# Patient Record
(demographics unavailable — no encounter records)

---

## 2024-10-23 NOTE — PLAN
[FreeTextEntry1] : 50 yo male, accompapnied by dtr, presents for evaluation an dmed renewals  chronic fatigue --   increased energy of late with medication prehypertension -   130/80                 controlled without medication  low testosterone .....     elvated levels after treatment.   dosing now lowered to 200mg q 10 days

## 2025-01-23 NOTE — HEALTH RISK ASSESSMENT
[0] : 2) Feeling down, depressed, or hopeless: Not at all (0) [PHQ-2 Negative - No further assessment needed] : PHQ-2 Negative - No further assessment needed [[id number]] : 0

## 2025-01-23 NOTE — HISTORY OF PRESENT ILLNESS
[FreeTextEntry1] : Needed prescription filled [de-identified] : GERD Low testosterone Morbid obesity

## 2025-01-23 NOTE — PLAN
[FreeTextEntry1] : 50-year-old gentleman presents for evaluation medication reconciliation GERD-he tolerates his symptoms occasionally he will use over-the-counter Pepcid with success Low testosterone-medication is reviewed and renewed Doing well at current dose has more energy Morbid obesity-5 foot 5 inch 212 pounds BMI greater than 30 diet and exercise are discussed as the cornerstone of the treatment of diseases of metabolic syndrome 130/70 Not currently on medication for hypertension

## 2025-02-20 NOTE — HEALTH RISK ASSESSMENT
[0] : 2) Feeling down, depressed, or hopeless: Not at all (0) [PHQ-2 Negative - No further assessment needed] : PHQ-2 Negative - No further assessment needed [[id number]] : 0

## 2025-02-20 NOTE — PLAN
[FreeTextEntry1] : 50-year-old male presents for evaluation medication reconciliation Morbid obesity-5 foot 5 inch 221 pounds BMI greater than 30 diet and exercise are discussed is cornerstone of the treatment of the diseases of metabolic syndrome Dependent edema-closely related to weight he is on his feet all day long he is encouraged to drop weight and then monitor the loss of the dependent edema GERD-over-the-counter Protonix as needed Low testosterone-renewal of testosterone cypionate 200 mg/mL on a weekly basis every 10 days

## 2025-03-28 NOTE — PLAN
[FreeTextEntry1] : 50-year-old gentleman for evaluation PSA-prostate cancer screening last year when he had a normal PSA and be repeated in June he has concerns about prostate cancer but is reassured Obesity-snoring is an issue he is concerned about his enlarged tonsils as being etiology however when he loses weight his snoring stops he is referred to ENT for further evaluation Low testosterone in male he continues on 200 mg of testosterone IM on a weekly basis with periodic levels for monitoring

## 2025-06-04 NOTE — HISTORY OF PRESENT ILLNESS
[FreeTextEntry1] : "I need a prescription for testosterone " [de-identified] : Supplementation with testosterone on a weekly basis/changed to every 2 weeks because of elevated levels

## 2025-06-04 NOTE — PLAN
[FreeTextEntry1] : 50-year-old gentleman for evaluation PSA-prostate cancer screening last year when he had a normal PSA and be repeated in June he has concerns about prostate cancer but is reassured Obesity-snoring is an issue he is concerned about his enlarged tonsils as being etiology however when he loses weight his snoring stops he is referred to ENT for further evaluation Low testosterone in male he continues on 200 mg of testosterone IM on a weekly basis with periodic levels for monitoring.  With supplementation his levels went to supranormal range.  It is advised to him that he use the injections on an every 2-week basis to keep them within normal range.  He agrees to do so and will return in 3 months

## 2025-06-04 NOTE — HEALTH RISK ASSESSMENT
[0] : 2) Feeling down, depressed, or hopeless: Not at all (0) [PHQ-2 Negative - No further assessment needed] : PHQ-2 Negative - No further assessment needed [[id number]] : 0